# Patient Record
Sex: FEMALE | URBAN - METROPOLITAN AREA
[De-identification: names, ages, dates, MRNs, and addresses within clinical notes are randomized per-mention and may not be internally consistent; named-entity substitution may affect disease eponyms.]

---

## 2024-06-13 ENCOUNTER — TRANSCRIBE ORDERS (OUTPATIENT)
Age: 84
End: 2024-06-13

## 2024-06-13 DIAGNOSIS — I73.9 PERIPHERAL VASCULAR DISEASE, UNSPECIFIED: ICD-10-CM

## 2024-06-13 DIAGNOSIS — L97.512 RIGHT FOOT ULCER, WITH FAT LAYER EXPOSED: Primary | ICD-10-CM

## 2024-06-13 DIAGNOSIS — M20.11 ACQUIRED HALLUX VALGUS OF RIGHT FOOT: ICD-10-CM

## 2024-09-13 ENCOUNTER — PRE-ADMISSION TESTING (OUTPATIENT)
Dept: PREADMISSION TESTING | Facility: HOSPITAL | Age: 84
End: 2024-09-13
Payer: MEDICARE

## 2024-09-13 ENCOUNTER — HOSPITAL ENCOUNTER (OUTPATIENT)
Dept: GENERAL RADIOLOGY | Facility: HOSPITAL | Age: 84
Discharge: HOME | End: 2024-09-13
Payer: MEDICARE

## 2024-09-13 VITALS
OXYGEN SATURATION: 94 % | TEMPERATURE: 97.8 F | DIASTOLIC BLOOD PRESSURE: 68 MMHG | RESPIRATION RATE: 20 BRPM | BODY MASS INDEX: 21.99 KG/M2 | HEIGHT: 65 IN | HEART RATE: 82 BPM | SYSTOLIC BLOOD PRESSURE: 168 MMHG | WEIGHT: 132 LBS

## 2024-09-13 LAB
ALBUMIN SERPL-MCNC: 4.1 G/DL (ref 3.5–5.2)
ALBUMIN/GLOB SERPL: 1.9 G/DL
ALP SERPL-CCNC: 71 U/L (ref 39–117)
ALT SERPL W P-5'-P-CCNC: 35 U/L (ref 1–33)
ANION GAP SERPL CALCULATED.3IONS-SCNC: 10 MMOL/L (ref 5–15)
AST SERPL-CCNC: 27 U/L (ref 1–32)
BILIRUB SERPL-MCNC: 0.3 MG/DL (ref 0–1.2)
BUN SERPL-MCNC: 17 MG/DL (ref 8–23)
BUN/CREAT SERPL: 22.1 (ref 7–25)
CALCIUM SPEC-SCNC: 9.6 MG/DL (ref 8.6–10.5)
CHLORIDE SERPL-SCNC: 103 MMOL/L (ref 98–107)
CO2 SERPL-SCNC: 25 MMOL/L (ref 22–29)
CREAT SERPL-MCNC: 0.77 MG/DL (ref 0.57–1)
DEPRECATED RDW RBC AUTO: 46.9 FL (ref 37–54)
EGFRCR SERPLBLD CKD-EPI 2021: 75.7 ML/MIN/1.73
ERYTHROCYTE [DISTWIDTH] IN BLOOD BY AUTOMATED COUNT: 12.3 % (ref 12.3–15.4)
GLOBULIN UR ELPH-MCNC: 2.2 GM/DL
GLUCOSE SERPL-MCNC: 78 MG/DL (ref 65–99)
HBA1C MFR BLD: 5.6 % (ref 4.8–5.6)
HCT VFR BLD AUTO: 39.3 % (ref 34–46.6)
HGB BLD-MCNC: 12.6 G/DL (ref 12–15.9)
INR PPP: 1.05 (ref 0.9–1.1)
MCH RBC QN AUTO: 33.2 PG (ref 26.6–33)
MCHC RBC AUTO-ENTMCNC: 32.1 G/DL (ref 31.5–35.7)
MCV RBC AUTO: 103.4 FL (ref 79–97)
PLATELET # BLD AUTO: 211 10*3/MM3 (ref 140–450)
PMV BLD AUTO: 9.8 FL (ref 6–12)
POTASSIUM SERPL-SCNC: 4.3 MMOL/L (ref 3.5–5.2)
PROT SERPL-MCNC: 6.3 G/DL (ref 6–8.5)
PROTHROMBIN TIME: 13.9 SECONDS (ref 11.7–14.2)
QT INTERVAL: 401 MS
QTC INTERVAL: 436 MS
RBC # BLD AUTO: 3.8 10*6/MM3 (ref 3.77–5.28)
SODIUM SERPL-SCNC: 138 MMOL/L (ref 136–145)
WBC NRBC COR # BLD AUTO: 9.08 10*3/MM3 (ref 3.4–10.8)

## 2024-09-13 PROCEDURE — 83036 HEMOGLOBIN GLYCOSYLATED A1C: CPT

## 2024-09-13 PROCEDURE — 85027 COMPLETE CBC AUTOMATED: CPT

## 2024-09-13 PROCEDURE — 85610 PROTHROMBIN TIME: CPT

## 2024-09-13 PROCEDURE — 36415 COLL VENOUS BLD VENIPUNCTURE: CPT

## 2024-09-13 PROCEDURE — 93010 ELECTROCARDIOGRAM REPORT: CPT | Performed by: INTERNAL MEDICINE

## 2024-09-13 PROCEDURE — 73502 X-RAY EXAM HIP UNI 2-3 VIEWS: CPT

## 2024-09-13 PROCEDURE — 80053 COMPREHEN METABOLIC PANEL: CPT

## 2024-09-13 PROCEDURE — 93005 ELECTROCARDIOGRAM TRACING: CPT

## 2024-09-13 RX ORDER — LOSARTAN POTASSIUM 25 MG/1
25 TABLET ORAL DAILY
COMMUNITY
Start: 2024-05-02

## 2024-09-13 RX ORDER — ACETAMINOPHEN 500 MG
500 TABLET ORAL EVERY 6 HOURS PRN
COMMUNITY
Start: 2024-07-11

## 2024-09-13 RX ORDER — CLOPIDOGREL BISULFATE 75 MG/1
75 TABLET ORAL DAILY
COMMUNITY
Start: 2024-06-17

## 2024-09-13 RX ORDER — ATORVASTATIN CALCIUM 20 MG/1
20 TABLET, FILM COATED ORAL DAILY
COMMUNITY
Start: 2024-06-17

## 2024-09-13 RX ORDER — ALBUTEROL SULFATE 90 UG/1
2 INHALANT RESPIRATORY (INHALATION) EVERY 4 HOURS PRN
COMMUNITY

## 2024-09-13 NOTE — PAT
Patient scheduled for a right total hip arthroplasty with Dr. Rocha.  She has never had a joint replacement. She has watched the joint replacement video.  She is here with her daughter today.  She is relatively healthy for her again.  No hx CAD.  She denies CP.  In the past she may get SOA walking up stairs but she has not been able to walk up stairs due to her hip.  She says she does not get SOA walking using her walker.  She says her daughter lives with her and will be able to help her after discharge.  Dr. Rocha is aware of the healing ulcer on her right toe.  Cardiac RRR.  No carotid bruit detectable.  Pulm:  Clear to ausc. Resp unlabored.  Labs and EKG acceptable.  X-ray pending.

## 2024-09-13 NOTE — DISCHARGE INSTRUCTIONS
CHLORHEXIDINE CLOTH INSTRUCTIONS  The morning of surgery follow these instructions using the Chlorhexidine cloths you've been given.  These steps reduce bacteria on the body.  Do not use the cloths near your eyes, ears mouth, genitalia or on open wounds.  Throw the cloths away after use but do not try to flush them down a toilet.      Open and remove one cloth at a time from the package.    Leave the cloth unfolded and begin the bathing.  Massage the skin with the cloths using gentle pressure to remove bacteria.  Do not scrub harshly.   Follow the steps below with one 2% CHG cloth per area (6 total cloths).  One cloth for neck, shoulders and chest.  One cloth for both arms, hands, fingers and underarms (do underarms last).  One cloth for the abdomen followed by groin.  One cloth for right leg and foot including between the toes.  One cloth for left leg and foot including between the toes.  The last cloth is to be used for the back of the neck, back and buttocks.    Allow the CHG to air dry 3 minutes on the skin which will give it time to work and decrease the chance of irritation.  The skin may feel sticky until it is dry.  Do not rinse with water or any other liquid or you will lose the beneficial effects of the CHG.  If mild skin irritation occurs, do rinse the skin to remove the CHG.  Report this to the nurse at time of admission.  Do not apply lotions, creams, ointments, deodorants or perfumes after using the clothes. Dress in clean clothes before coming to the hospital.    Take the following medications the morning of surgery:  INHALER   FOLLOW DR MCKENZIE INSTRUCTIONS REGARDING CLOPIDOGREL       If you are on prescription narcotic pain medication to control your pain you may also take that medication the morning of surgery.      General Instructions:     Do not eat solid food after midnight the night before surgery.  Clear liquids day of surgery are allowed but must be stopped at least two hours before your  hospital arrival time.       Allowed clear liquids      Water, sodas, and tea or coffee with no cream or milk added.       12 to 20 ounces of a clear liquid that contains carbohydrates is recommended.  If non-diabetic, have Gatorade or Powerade.  If diabetic, have G2 or Powerade Zero.     Do not have liquids red in color.  Do not consume chicken, beef, pork or vegetable broth or bouillon cubes of any variety as they are not considered clear liquids and are not allowed.      Infants may have breast milk up to four hours before surgery.  Infants drinking formula may drink formula up to six hours before surgery.   Patients who avoid smoking, chewing tobacco and alcohol for 4 weeks prior to surgery have a reduced risk of post-operative complications.  Quit smoking as many days before surgery as you can.  Do not smoke, use chewing tobacco or drink alcohol the day of surgery.   If applicable bring your C-PAP/ BI-PAP machine in with you to preop day of surgery.  Bring any papers given to you in the doctor’s office.  Wear clean comfortable clothes.  Do not wear contact lenses, false eyelashes or make-up.  Bring a case for your glasses.   Bring crutches or walker if applicable.  Remove all piercings.  Leave jewelry and any other valuables at home.  Hair extensions with metal clips must be removed prior to surgery.  The Pre-Admission Testing nurse will instruct you to bring medications if unable to obtain an accurate list in Pre-Admission Testing.          Preventing a Surgical Site Infection:  For 2 to 3 days before surgery, avoid shaving with a razor because the razor can irritate skin and make it easier to develop an infection.    Any areas of open skin can increase the risk of a post-operative wound infection by allowing bacteria to enter and travel throughout the body.  Notify your surgeon if you have any skin wounds / rashes even if it is not near the expected surgical site.  The area will need assessed to determine if  surgery should be delayed until it is healed.  The night prior to surgery shower using a fresh bar of anti-bacterial soap (such as Dial) and clean washcloth.  Sleep in a clean bed with clean clothing.  Do not allow pets to sleep with you.  Shower on the morning of surgery using a fresh bar of anti-bacterial soap (such as Dial) and clean washcloth.  Dry with a clean towel and dress in clean clothing.  Ask your surgeon if you will be receiving antibiotics prior to surgery.  Make sure you, your family, and all healthcare providers clean their hands with soap and water or an alcohol based hand  before caring for you or your wound.    Day of surgery:  Your arrival time is approximately two hours before your scheduled surgery time.  Please note if you have an early arrival time the surgery doors do not open before 5:00 AM.  Upon arrival, a Pre-op nurse and Anesthesiologist will review your health history, obtain vital signs, and answer questions you may have.  The only belongings needed at this time will be a list of your home medications and if applicable your C-PAP/BI-PAP machine.  A Pre-op nurse will start an IV and you may receive medication in preparation for surgery, including something to help you relax.     Please be aware that surgery does come with discomfort.  We want to make every effort to control your discomfort so please discuss any uncontrolled symptoms with your nurse.   Your doctor will most likely have prescribed pain medications.      If you are going home after surgery you will receive individualized written care instructions before being discharged.  A responsible adult must drive you to and from the hospital on the day of your surgery and ideally stay with you through the night.   .  Discharge prescriptions can be filled by the hospital pharmacy during regular pharmacy hours.  If you are having surgery late in the day/evening your prescription may be e-prescribed to your pharmacy.  Please  verify your pharmacy hours or chose a 24 hour pharmacy to avoid not having access to your prescription because your pharmacy has closed for the day.    If you are staying overnight following surgery, you will be transported to your hospital room following the recovery period.  [facility] has all private rooms.    If you have any questions please call Pre-Admission Testing at [phone].  Deductibles and co-payments are collected on the day of service. Please be prepared to pay the required co-pay, deductible or deposit on the day of service as defined by your plan.    Call your surgeon immediately if you experience any of the following symptoms:  Sore Throat  Shortness of Breath or difficulty breathing  Cough  Chills  Body soreness or muscle pain  Headache  Fever  New loss of taste or smell  Do not arrive for your surgery ill.  Your procedure will need to be rescheduled to another time.  You will need to call your physician before the day of surgery to avoid any unnecessary exposure to hospital staff as well as other patients.

## 2024-09-25 ENCOUNTER — ANESTHESIA EVENT (OUTPATIENT)
Dept: PERIOP | Facility: HOSPITAL | Age: 84
End: 2024-09-25
Payer: MEDICARE

## 2024-09-25 ENCOUNTER — HOSPITAL ENCOUNTER (OUTPATIENT)
Facility: HOSPITAL | Age: 84
Setting detail: OBSERVATION
Discharge: HOME HEALTH | End: 2024-09-26
Attending: ORTHOPAEDIC SURGERY | Admitting: ORTHOPAEDIC SURGERY
Payer: MEDICARE

## 2024-09-25 ENCOUNTER — APPOINTMENT (OUTPATIENT)
Dept: GENERAL RADIOLOGY | Facility: HOSPITAL | Age: 84
End: 2024-09-25
Payer: MEDICARE

## 2024-09-25 ENCOUNTER — ANESTHESIA (OUTPATIENT)
Dept: PERIOP | Facility: HOSPITAL | Age: 84
End: 2024-09-25
Payer: MEDICARE

## 2024-09-25 DIAGNOSIS — Z96.641 H/O TOTAL HIP ARTHROPLASTY, RIGHT: Primary | ICD-10-CM

## 2024-09-25 PROCEDURE — 25010000002 PROPOFOL 10 MG/ML EMULSION: Performed by: NURSE ANESTHETIST, CERTIFIED REGISTERED

## 2024-09-25 PROCEDURE — 25010000002 ROPIVACAINE PER 1 MG: Performed by: ORTHOPAEDIC SURGERY

## 2024-09-25 PROCEDURE — 25010000002 KETOROLAC TROMETHAMINE PER 15 MG: Performed by: ORTHOPAEDIC SURGERY

## 2024-09-25 PROCEDURE — 73501 X-RAY EXAM HIP UNI 1 VIEW: CPT

## 2024-09-25 PROCEDURE — G0378 HOSPITAL OBSERVATION PER HR: HCPCS

## 2024-09-25 PROCEDURE — 25810000003 LACTATED RINGERS PER 1000 ML: Performed by: STUDENT IN AN ORGANIZED HEALTH CARE EDUCATION/TRAINING PROGRAM

## 2024-09-25 PROCEDURE — C1776 JOINT DEVICE (IMPLANTABLE): HCPCS | Performed by: ORTHOPAEDIC SURGERY

## 2024-09-25 PROCEDURE — 63710000001 POLYETHYLENE GLYCOL 17 G PACK: Performed by: ORTHOPAEDIC SURGERY

## 2024-09-25 PROCEDURE — A9270 NON-COVERED ITEM OR SERVICE: HCPCS | Performed by: ORTHOPAEDIC SURGERY

## 2024-09-25 PROCEDURE — 25010000002 EPINEPHRINE 1 MG/ML SOLUTION 30 ML VIAL: Performed by: ORTHOPAEDIC SURGERY

## 2024-09-25 PROCEDURE — 63710000001 ASPIRIN 81 MG TABLET DELAYED-RELEASE: Performed by: ORTHOPAEDIC SURGERY

## 2024-09-25 PROCEDURE — 25010000002 CLONIDINE PER 1 MG: Performed by: ORTHOPAEDIC SURGERY

## 2024-09-25 PROCEDURE — 76000 FLUOROSCOPY <1 HR PHYS/QHP: CPT

## 2024-09-25 PROCEDURE — 25010000002 BUPIVACAINE PF 0.75 % SOLUTION: Performed by: STUDENT IN AN ORGANIZED HEALTH CARE EDUCATION/TRAINING PROGRAM

## 2024-09-25 PROCEDURE — 25010000002 CEFAZOLIN PER 500 MG: Performed by: ORTHOPAEDIC SURGERY

## 2024-09-25 PROCEDURE — C1713 ANCHOR/SCREW BN/BN,TIS/BN: HCPCS | Performed by: ORTHOPAEDIC SURGERY

## 2024-09-25 PROCEDURE — 25010000002 ONDANSETRON PER 1 MG: Performed by: NURSE ANESTHETIST, CERTIFIED REGISTERED

## 2024-09-25 PROCEDURE — 63710000001 ATORVASTATIN 20 MG TABLET: Performed by: ORTHOPAEDIC SURGERY

## 2024-09-25 DEVICE — CANCELLOUS BONE SCREW Ø 6.5 L 40
Type: IMPLANTABLE DEVICE | Site: HIP | Status: FUNCTIONAL
Brand: MPACT EXTENSION

## 2024-09-25 DEVICE — PALACOS® R IS A FAST-CURING, RADIOPAQUE, POLY(METHYL METHACRYLATE)-BASED BONE CEMENT.PALACOS ® R CONTAINS THE X-RAY CONTRAST MEDIUM ZIRCONIUM DIOXIDE. TO IMPROVE VISIBILITY IN THE SURGICAL FIELD PALACOS ® R HAS BEEN COLOURED WITH CHLOROPHYLL (E141). THE BONE CEMENT IS PREPARED DIRECTLY BEFORE USE BY MIXING A POLYMER POWDER COMPONENT WITH A LIQUID MONOMER COMPONENT. A DUCTILE DOUGH FORMS WHICH CURES WITHIN A FEW MINUTES.
Type: IMPLANTABLE DEVICE | Site: HIP | Status: FUNCTIONAL
Brand: PALACOS®

## 2024-09-25 DEVICE — FLAT PE  HC LINER Ø 36 / E
Type: IMPLANTABLE DEVICE | Site: HIP | Status: FUNCTIONAL
Brand: MPACT ACETABULAR SYSTEM

## 2024-09-25 DEVICE — CANCELLOUS BONE SCREW Ø 6.5 L 35
Type: IMPLANTABLE DEVICE | Site: HIP | Status: FUNCTIONAL
Brand: MPACT EXTENSION

## 2024-09-25 DEVICE — KNOTLESS TISSUE CONTROL DEVICE, UNDYED UNIDIRECTIONAL (ANTIBACTERIAL) SYNTHETIC ABSORBABLE DEVICE
Type: IMPLANTABLE DEVICE | Site: HIP | Status: FUNCTIONAL
Brand: STRATAFIX

## 2024-09-25 DEVICE — AMISTEM C CEMENTED STEM STANDARD SIZE 2
Type: IMPLANTABLE DEVICE | Site: HIP | Status: FUNCTIONAL
Brand: AMISTEM C FEMORAL STEMS

## 2024-09-25 DEVICE — ACETABULAR SHELL Ø54 TWO-HOLE
Type: IMPLANTABLE DEVICE | Site: HIP | Status: FUNCTIONAL
Brand: MPACT 3D METAL

## 2024-09-25 DEVICE — IMPLANTABLE DEVICE: Type: IMPLANTABLE DEVICE | Status: FUNCTIONAL

## 2024-09-25 DEVICE — DEV CONTRL TISS STRATAFIX SYMM PDS PLUS VIL CT-1 45CM: Type: IMPLANTABLE DEVICE | Site: HIP | Status: FUNCTIONAL

## 2024-09-25 DEVICE — FEMORAL HEAD Ø 36 SIZE L
Type: IMPLANTABLE DEVICE | Site: HIP | Status: FUNCTIONAL
Brand: MECTACER BIOLOX DELTA FEMORAL BALL HEAD

## 2024-09-25 RX ORDER — PROMETHAZINE HYDROCHLORIDE 25 MG/1
25 SUPPOSITORY RECTAL ONCE AS NEEDED
Status: DISCONTINUED | OUTPATIENT
Start: 2024-09-25 | End: 2024-09-25 | Stop reason: HOSPADM

## 2024-09-25 RX ORDER — FENTANYL CITRATE 50 UG/ML
25 INJECTION, SOLUTION INTRAMUSCULAR; INTRAVENOUS
Status: DISCONTINUED | OUTPATIENT
Start: 2024-09-25 | End: 2024-09-25 | Stop reason: HOSPADM

## 2024-09-25 RX ORDER — SODIUM CHLORIDE 9 MG/ML
100 INJECTION, SOLUTION INTRAVENOUS CONTINUOUS
Status: DISCONTINUED | OUTPATIENT
Start: 2024-09-25 | End: 2024-09-26 | Stop reason: HOSPADM

## 2024-09-25 RX ORDER — DOCUSATE SODIUM 100 MG/1
100 CAPSULE, LIQUID FILLED ORAL 2 TIMES DAILY PRN
Status: DISCONTINUED | OUTPATIENT
Start: 2024-09-25 | End: 2024-09-26 | Stop reason: HOSPADM

## 2024-09-25 RX ORDER — HYDROCODONE BITARTRATE AND ACETAMINOPHEN 7.5; 325 MG/1; MG/1
1 TABLET ORAL EVERY 4 HOURS PRN
Status: DISCONTINUED | OUTPATIENT
Start: 2024-09-25 | End: 2024-09-25 | Stop reason: HOSPADM

## 2024-09-25 RX ORDER — DIPHENHYDRAMINE HYDROCHLORIDE 50 MG/ML
12.5 INJECTION INTRAMUSCULAR; INTRAVENOUS
Status: DISCONTINUED | OUTPATIENT
Start: 2024-09-25 | End: 2024-09-25 | Stop reason: HOSPADM

## 2024-09-25 RX ORDER — ALBUTEROL SULFATE 0.83 MG/ML
2.5 SOLUTION RESPIRATORY (INHALATION) EVERY 4 HOURS PRN
Status: DISCONTINUED | OUTPATIENT
Start: 2024-09-25 | End: 2024-09-26 | Stop reason: HOSPADM

## 2024-09-25 RX ORDER — SODIUM CHLORIDE 0.9 % (FLUSH) 0.9 %
3-10 SYRINGE (ML) INJECTION AS NEEDED
Status: DISCONTINUED | OUTPATIENT
Start: 2024-09-25 | End: 2024-09-25 | Stop reason: HOSPADM

## 2024-09-25 RX ORDER — MELOXICAM 7.5 MG/1
7.5 TABLET ORAL DAILY
Status: DISCONTINUED | OUTPATIENT
Start: 2024-09-26 | End: 2024-09-26 | Stop reason: HOSPADM

## 2024-09-25 RX ORDER — HYDROCODONE BITARTRATE AND ACETAMINOPHEN 7.5; 325 MG/1; MG/1
2 TABLET ORAL EVERY 4 HOURS PRN
Status: DISCONTINUED | OUTPATIENT
Start: 2024-09-25 | End: 2024-09-26 | Stop reason: HOSPADM

## 2024-09-25 RX ORDER — LIDOCAINE HYDROCHLORIDE 10 MG/ML
0.5 INJECTION, SOLUTION INFILTRATION; PERINEURAL ONCE AS NEEDED
Status: DISCONTINUED | OUTPATIENT
Start: 2024-09-25 | End: 2024-09-25 | Stop reason: HOSPADM

## 2024-09-25 RX ORDER — PROMETHAZINE HYDROCHLORIDE 25 MG/1
25 TABLET ORAL ONCE AS NEEDED
Status: DISCONTINUED | OUTPATIENT
Start: 2024-09-25 | End: 2024-09-25 | Stop reason: HOSPADM

## 2024-09-25 RX ORDER — HYDRALAZINE HYDROCHLORIDE 20 MG/ML
5 INJECTION INTRAMUSCULAR; INTRAVENOUS
Status: DISCONTINUED | OUTPATIENT
Start: 2024-09-25 | End: 2024-09-25 | Stop reason: HOSPADM

## 2024-09-25 RX ORDER — FLUMAZENIL 0.1 MG/ML
0.2 INJECTION INTRAVENOUS AS NEEDED
Status: DISCONTINUED | OUTPATIENT
Start: 2024-09-25 | End: 2024-09-25 | Stop reason: HOSPADM

## 2024-09-25 RX ORDER — ACETAMINOPHEN 500 MG
1000 TABLET ORAL ONCE
Status: COMPLETED | OUTPATIENT
Start: 2024-09-25 | End: 2024-09-25

## 2024-09-25 RX ORDER — PHENYLEPHRINE HCL IN 0.9% NACL 1 MG/10 ML
SYRINGE (ML) INTRAVENOUS AS NEEDED
Status: DISCONTINUED | OUTPATIENT
Start: 2024-09-25 | End: 2024-09-25 | Stop reason: SURG

## 2024-09-25 RX ORDER — NALOXONE HCL 0.4 MG/ML
0.1 VIAL (ML) INJECTION
Status: DISCONTINUED | OUTPATIENT
Start: 2024-09-25 | End: 2024-09-26 | Stop reason: HOSPADM

## 2024-09-25 RX ORDER — HYDROCODONE BITARTRATE AND ACETAMINOPHEN 5; 325 MG/1; MG/1
1 TABLET ORAL ONCE AS NEEDED
Status: DISCONTINUED | OUTPATIENT
Start: 2024-09-25 | End: 2024-09-25 | Stop reason: HOSPADM

## 2024-09-25 RX ORDER — ONDANSETRON 2 MG/ML
4 INJECTION INTRAMUSCULAR; INTRAVENOUS ONCE AS NEEDED
Status: DISCONTINUED | OUTPATIENT
Start: 2024-09-25 | End: 2024-09-25 | Stop reason: HOSPADM

## 2024-09-25 RX ORDER — BUPIVACAINE HYDROCHLORIDE 7.5 MG/ML
INJECTION, SOLUTION EPIDURAL; RETROBULBAR
Status: COMPLETED | OUTPATIENT
Start: 2024-09-25 | End: 2024-09-25

## 2024-09-25 RX ORDER — PREGABALIN 75 MG/1
75 CAPSULE ORAL ONCE
Status: COMPLETED | OUTPATIENT
Start: 2024-09-25 | End: 2024-09-25

## 2024-09-25 RX ORDER — HYDROMORPHONE HYDROCHLORIDE 1 MG/ML
0.5 INJECTION, SOLUTION INTRAMUSCULAR; INTRAVENOUS; SUBCUTANEOUS
Status: DISCONTINUED | OUTPATIENT
Start: 2024-09-25 | End: 2024-09-26 | Stop reason: HOSPADM

## 2024-09-25 RX ORDER — MAGNESIUM HYDROXIDE 1200 MG/15ML
LIQUID ORAL AS NEEDED
Status: DISCONTINUED | OUTPATIENT
Start: 2024-09-25 | End: 2024-09-25 | Stop reason: HOSPADM

## 2024-09-25 RX ORDER — TRANEXAMIC ACID 100 MG/ML
INJECTION, SOLUTION INTRAVENOUS AS NEEDED
Status: DISCONTINUED | OUTPATIENT
Start: 2024-09-25 | End: 2024-09-25 | Stop reason: SURG

## 2024-09-25 RX ORDER — SODIUM CHLORIDE 0.9 % (FLUSH) 0.9 %
3 SYRINGE (ML) INJECTION EVERY 12 HOURS SCHEDULED
Status: DISCONTINUED | OUTPATIENT
Start: 2024-09-25 | End: 2024-09-25 | Stop reason: HOSPADM

## 2024-09-25 RX ORDER — MIDAZOLAM HYDROCHLORIDE 1 MG/ML
0.5 INJECTION INTRAMUSCULAR; INTRAVENOUS
Status: DISCONTINUED | OUTPATIENT
Start: 2024-09-25 | End: 2024-09-25

## 2024-09-25 RX ORDER — ONDANSETRON 2 MG/ML
INJECTION INTRAMUSCULAR; INTRAVENOUS AS NEEDED
Status: DISCONTINUED | OUTPATIENT
Start: 2024-09-25 | End: 2024-09-25 | Stop reason: SURG

## 2024-09-25 RX ORDER — DROPERIDOL 2.5 MG/ML
0.62 INJECTION, SOLUTION INTRAMUSCULAR; INTRAVENOUS
Status: DISCONTINUED | OUTPATIENT
Start: 2024-09-25 | End: 2024-09-25 | Stop reason: HOSPADM

## 2024-09-25 RX ORDER — ATORVASTATIN CALCIUM 20 MG/1
20 TABLET, FILM COATED ORAL DAILY
Status: DISCONTINUED | OUTPATIENT
Start: 2024-09-25 | End: 2024-09-26 | Stop reason: HOSPADM

## 2024-09-25 RX ORDER — ONDANSETRON 4 MG/1
4 TABLET, ORALLY DISINTEGRATING ORAL EVERY 6 HOURS PRN
Status: DISCONTINUED | OUTPATIENT
Start: 2024-09-25 | End: 2024-09-26 | Stop reason: HOSPADM

## 2024-09-25 RX ORDER — FENTANYL CITRATE 50 UG/ML
50 INJECTION, SOLUTION INTRAMUSCULAR; INTRAVENOUS ONCE AS NEEDED
Status: DISCONTINUED | OUTPATIENT
Start: 2024-09-25 | End: 2024-09-25

## 2024-09-25 RX ORDER — ASPIRIN 81 MG/1
81 TABLET ORAL 2 TIMES DAILY
Status: DISCONTINUED | OUTPATIENT
Start: 2024-09-25 | End: 2024-09-26 | Stop reason: HOSPADM

## 2024-09-25 RX ORDER — NALOXONE HCL 0.4 MG/ML
0.2 VIAL (ML) INJECTION AS NEEDED
Status: DISCONTINUED | OUTPATIENT
Start: 2024-09-25 | End: 2024-09-25 | Stop reason: HOSPADM

## 2024-09-25 RX ORDER — ONDANSETRON 2 MG/ML
4 INJECTION INTRAMUSCULAR; INTRAVENOUS EVERY 6 HOURS PRN
Status: DISCONTINUED | OUTPATIENT
Start: 2024-09-25 | End: 2024-09-26 | Stop reason: HOSPADM

## 2024-09-25 RX ORDER — LIDOCAINE HYDROCHLORIDE 20 MG/ML
INJECTION, SOLUTION EPIDURAL; INFILTRATION; INTRACAUDAL; PERINEURAL AS NEEDED
Status: DISCONTINUED | OUTPATIENT
Start: 2024-09-25 | End: 2024-09-25 | Stop reason: SURG

## 2024-09-25 RX ORDER — IPRATROPIUM BROMIDE AND ALBUTEROL SULFATE 2.5; .5 MG/3ML; MG/3ML
3 SOLUTION RESPIRATORY (INHALATION) ONCE AS NEEDED
Status: DISCONTINUED | OUTPATIENT
Start: 2024-09-25 | End: 2024-09-25 | Stop reason: HOSPADM

## 2024-09-25 RX ORDER — FAMOTIDINE 10 MG/ML
20 INJECTION, SOLUTION INTRAVENOUS ONCE
Status: DISCONTINUED | OUTPATIENT
Start: 2024-09-25 | End: 2024-09-25 | Stop reason: HOSPADM

## 2024-09-25 RX ORDER — HYDROMORPHONE HYDROCHLORIDE 1 MG/ML
0.25 INJECTION, SOLUTION INTRAMUSCULAR; INTRAVENOUS; SUBCUTANEOUS
Status: DISCONTINUED | OUTPATIENT
Start: 2024-09-25 | End: 2024-09-25 | Stop reason: HOSPADM

## 2024-09-25 RX ORDER — TRAMADOL HYDROCHLORIDE 50 MG/1
50 TABLET ORAL EVERY 8 HOURS PRN
Status: DISCONTINUED | OUTPATIENT
Start: 2024-09-25 | End: 2024-09-26 | Stop reason: HOSPADM

## 2024-09-25 RX ORDER — SODIUM CHLORIDE, SODIUM LACTATE, POTASSIUM CHLORIDE, CALCIUM CHLORIDE 600; 310; 30; 20 MG/100ML; MG/100ML; MG/100ML; MG/100ML
9 INJECTION, SOLUTION INTRAVENOUS CONTINUOUS
Status: DISCONTINUED | OUTPATIENT
Start: 2024-09-25 | End: 2024-09-25

## 2024-09-25 RX ORDER — POLYETHYLENE GLYCOL 3350 17 G/17G
17 POWDER, FOR SOLUTION ORAL 2 TIMES DAILY
Status: DISCONTINUED | OUTPATIENT
Start: 2024-09-25 | End: 2024-09-26 | Stop reason: HOSPADM

## 2024-09-25 RX ORDER — ACETAMINOPHEN 325 MG/1
325 TABLET ORAL EVERY 4 HOURS PRN
Status: DISCONTINUED | OUTPATIENT
Start: 2024-09-25 | End: 2024-09-26 | Stop reason: HOSPADM

## 2024-09-25 RX ORDER — HYDROCODONE BITARTRATE AND ACETAMINOPHEN 7.5; 325 MG/1; MG/1
1 TABLET ORAL EVERY 4 HOURS PRN
Status: DISCONTINUED | OUTPATIENT
Start: 2024-09-25 | End: 2024-09-26 | Stop reason: HOSPADM

## 2024-09-25 RX ORDER — KETOROLAC TROMETHAMINE 15 MG/ML
15 INJECTION, SOLUTION INTRAMUSCULAR; INTRAVENOUS EVERY 6 HOURS
Status: DISCONTINUED | OUTPATIENT
Start: 2024-09-25 | End: 2024-09-26 | Stop reason: HOSPADM

## 2024-09-25 RX ORDER — LABETALOL HYDROCHLORIDE 5 MG/ML
5 INJECTION, SOLUTION INTRAVENOUS
Status: DISCONTINUED | OUTPATIENT
Start: 2024-09-25 | End: 2024-09-25 | Stop reason: HOSPADM

## 2024-09-25 RX ORDER — EPHEDRINE SULFATE 50 MG/ML
5 INJECTION, SOLUTION INTRAVENOUS ONCE AS NEEDED
Status: DISCONTINUED | OUTPATIENT
Start: 2024-09-25 | End: 2024-09-25 | Stop reason: HOSPADM

## 2024-09-25 RX ORDER — CELECOXIB 200 MG/1
400 CAPSULE ORAL ONCE
Status: COMPLETED | OUTPATIENT
Start: 2024-09-25 | End: 2024-09-25

## 2024-09-25 RX ADMIN — BUPIVACAINE HYDROCHLORIDE 1.4 ML: 7.5 INJECTION, SOLUTION EPIDURAL; RETROBULBAR at 12:07

## 2024-09-25 RX ADMIN — TRANEXAMIC ACID 1000 MG: 100 INJECTION, SOLUTION INTRAVENOUS at 13:52

## 2024-09-25 RX ADMIN — ASPIRIN 81 MG: 81 TABLET, COATED ORAL at 18:11

## 2024-09-25 RX ADMIN — SODIUM CHLORIDE 2 G: 900 INJECTION INTRAVENOUS at 19:50

## 2024-09-25 RX ADMIN — Medication 150 MCG: at 13:22

## 2024-09-25 RX ADMIN — SODIUM CHLORIDE, POTASSIUM CHLORIDE, SODIUM LACTATE AND CALCIUM CHLORIDE: 600; 310; 30; 20 INJECTION, SOLUTION INTRAVENOUS at 13:19

## 2024-09-25 RX ADMIN — Medication 100 MCG: at 12:33

## 2024-09-25 RX ADMIN — Medication 100 MCG: at 13:03

## 2024-09-25 RX ADMIN — Medication 100 MCG: at 12:49

## 2024-09-25 RX ADMIN — SODIUM CHLORIDE 2000 MG: 900 INJECTION INTRAVENOUS at 11:47

## 2024-09-25 RX ADMIN — PREGABALIN 75 MG: 75 CAPSULE ORAL at 08:57

## 2024-09-25 RX ADMIN — ONDANSETRON 4 MG: 2 INJECTION INTRAMUSCULAR; INTRAVENOUS at 14:08

## 2024-09-25 RX ADMIN — Medication 150 MCG: at 13:18

## 2024-09-25 RX ADMIN — Medication 150 MCG: at 13:28

## 2024-09-25 RX ADMIN — Medication 150 MCG: at 13:58

## 2024-09-25 RX ADMIN — PROPOFOL 100 MCG/KG/MIN: 10 INJECTION, EMULSION INTRAVENOUS at 12:14

## 2024-09-25 RX ADMIN — Medication 150 MCG: at 13:53

## 2024-09-25 RX ADMIN — ATORVASTATIN CALCIUM 20 MG: 20 TABLET, FILM COATED ORAL at 18:11

## 2024-09-25 RX ADMIN — LIDOCAINE HYDROCHLORIDE 100 MG: 20 INJECTION, SOLUTION EPIDURAL; INFILTRATION; INTRACAUDAL; PERINEURAL at 12:14

## 2024-09-25 RX ADMIN — TRANEXAMIC ACID 1000 MG: 100 INJECTION, SOLUTION INTRAVENOUS at 12:20

## 2024-09-25 RX ADMIN — SODIUM CHLORIDE, POTASSIUM CHLORIDE, SODIUM LACTATE AND CALCIUM CHLORIDE: 600; 310; 30; 20 INJECTION, SOLUTION INTRAVENOUS at 11:52

## 2024-09-25 RX ADMIN — ACETAMINOPHEN 1000 MG: 500 TABLET ORAL at 08:57

## 2024-09-25 RX ADMIN — KETOROLAC TROMETHAMINE 15 MG: 15 INJECTION, SOLUTION INTRAMUSCULAR; INTRAVENOUS at 23:59

## 2024-09-25 RX ADMIN — CELECOXIB 400 MG: 200 CAPSULE ORAL at 08:57

## 2024-09-25 RX ADMIN — Medication 150 MCG: at 13:38

## 2024-09-25 RX ADMIN — KETOROLAC TROMETHAMINE 15 MG: 15 INJECTION, SOLUTION INTRAMUSCULAR; INTRAVENOUS at 18:11

## 2024-09-25 RX ADMIN — POLYETHYLENE GLYCOL 3350 17 G: 17 POWDER, FOR SOLUTION ORAL at 19:51

## 2024-09-25 RX ADMIN — Medication 150 MCG: at 14:08

## 2024-09-25 NOTE — SIGNIFICANT NOTE
"   09/25/24 0927   Peripheral IV 09/25/24 0927 Anterior;Left Forearm   Placement date: If unknown, DO NOT use \"Add Comment\" note/Placement time: If unknown, DO NOT use \"Add Comment\" note: 09/25/24 0927   Hand Hygiene Completed: Yes  Size (Gauge): (c) 20 G  Orientation: Anterior;Left  Location: Forearm  Site Prep: Chlorhe...   Site Assessment Clean;Dry;Intact   Dressing Type Transparent   Line Status Blood return noted;Infusing;Flushed   Dressing Status Clean;Dry;Intact   Dressing Intervention New dressing     Ultrasound Inserted IV Site:lfa    Catheter Length:1.5in    Diameter:0.22cm    Depth:0.25cm      Vascular Access Score=4  1) Palpable / Visible / Dis  2) Palpable / Viasible / Not Distended  3) Easily Palpable / Not Visibile  4) Poorly Palpable / Visible  5) Poorly / Nonpalpable / NV   "

## 2024-09-25 NOTE — OP NOTE
RightTOTAL HIP ARTHROPLASTY ANTERIOR WITH HANA TABLE  Procedure Note    Magali Pulliam  9/25/2024    Pre-op Diagnosis: Right hip osteoarthritis.   Post-op Diagnosis: Same  Procedure:    Right total hip arthroplasty, anterior approach CPT Code 54823    Intraoperative interpretation of fluoroscopic imaging of the hip, 2-3 views CPT code 07247  Approach: Surgical Approach: Hip Direct Anterior (Smith-Bartlett)      Surgeon:  Erik Rocha MD  Assistant: CHARLES Harvey  The services of a first assist were necessary for performing the procedure safely and expeditiously.  The first assist was present for the entire duration of the case and helped with positioning, retraction and closure of the incision.    Anesthesia: Spinal, Anesthesiologist: Thony Hernández MD; Shruthi Calabrese MD  CRNA: Imelda Hamilton CRNA  Staff: Circulator: Olivia Moseley RN  Radiology Technologist: Brittney Vallejo  Scrub Person: Melvina Martínez  Orderly: Franky Nogueira  Assistant: Abdullahi Velez PA-C CFA  Estimated Blood Loss: 100ml  Specimens: * No orders in the log *  Drains: none  Complications: None    Components Utilized:    Implant Name Type Inv. Item Serial No.  Lot No. LRB No. Used Action   DEV CONTRL TISS STRATAFIX SPIRAL MNCRYL UD 3/0 PLS 30CM - [id number] Implant DEV CONTRL TISS STRATAFIX SPIRAL MNCRYL UD 3/0 PLS 30CM  [employer] [id number] Right 1 Implanted   DEV CONTRL TISS STRATAFIX SYMM PDS PLUS GRACE CT-1 45CM - [id number] Implant DEV CONTRL TISS STRATAFIX SYMM PDS PLUS GRACE CT-1 45CM  [employer]  [employer] [id number] Right 1 Implanted   CMT BONE PALACOS R HI/VISC 1X40 - [id number] Implant CMT BONE PALACOS R HI/VISC 1X40  [facility] [id number] Right 1 Implanted   CMT BONE PALACOS R HI/VISC 1X40 - [id number] Implant CMT BONE PALACOS R HI/VISC 1X40  [employer] [id number] Right 1 Implanted   SHLL ACET MPACT 3D 2HL 54MM - [id number] Implant SHLL ACET MPACT 3D 2HL 54MM  [employer]  [place] [id number] Right 1 Implanted   LINER ACET MPACT HOODED HX/PE 0DEG MARLYN 36MM - [id number] Implant LINER ACET MPACT HOODED HX/PE 0DEG MARLYN 36MM  [employer] [place] [id number] Right 1 Implanted   SCRW CANC BONE 6.5X35MM - [id number] Implant SCRW CANC BONE 6.5X35MM  [employer] [place] [id number] Right 1 Implanted   SCRW CANC BONE 6.5X40MM - [id number] Implant SCRW CANC BONE 6.5X40MM  [employer] [place] [id number] Right 1 Implanted   STEM FEM/HIP AMISTEMC CMT SS SZ2 - [id number] Implant STEM FEM/HIP AMISTEMC CMT SS SZ2  [employer] [place] [id number] Right 1 Implanted   HD FEM/HIP MECTACER BIOLOX/DELTA 36MM LG - [id number] Implant HD FEM/HIP MECTACER BIOLOX/DELTA 36MM LG  [employer] [place] [id number] Right 1 Implanted       Indication for Procedure:  This patient is a 84 y.o. female who has failed conservative treatment for management of arthritis of the operative hip.  Surgical options and non-surgical options were discussed in detail and to the patient's satisfaction.  Surgical intervention was recommended based on the patient's injury and functional status.      The risks and benefits of surgery were discussed with patient and informed consent was obtained.  Risks include but are not limited to, infection, bleeding, nerve injury, blood clots, risks associated with anesthesia, need for further surgery, persistent pain, and possibly death. The risk of leg length discrepancy was also discussed with the patient.    Protocols for intravenous antibiotics and venous thrombosis were followed for this patient.  IV antibiotics were infused prior to surgery and will be discontinued within 24 hours of completion of the surgical procedure.         DESCRIPTION OF PROCEDURE:     The patient was seen in preoperative area where their surgical site was marked. Preoperative antibiotics were received. H&P and consent updated. They were taken to the Operating Room and provided the aforementioned anesthetic on the hospital bed.  The patient was then transferred to the [facility] operating table  in the supine position.  Traction boots were applied.  The perineal post was placed.  All bony prominences were well padded.  At this point the extremity is prepped and draped in usual sterile fashion using alcohol followed by ChloraPrep.  Next a formal surgical timeout was carried out.  All members of the team were in agreement.  At this point the anterior superior iliac spine was marked out and an incision extending from about to 2 cm lateral and 1 cm distal to the anterior superior iliac spine was extended in the longitudinal fashion through the skin and subcutaneous tissue with a 10 blade scalpel.  The fascia overlying the tensor fascia grzegorz muscle belly was identified and sharply incised.  The tensor fascia was reflected laterally and an Adson-Neel retractor was placed.  It was noted that she had significant fatty atrophy of her tensor rectus as well as her gluteus medius.  Next the myofascia of the rectus femoris was incised using the Bovie and the rectus was reflected medially.  The reflected head of the rectus was partially released.  Next the floor of the rectus femoris fascia was incised the ascending branch lateral femoral circumflex vessels were identified and cauterized using the bipolar cautery device.  Next the pericapsular fat pad was incised and the iliocapsularis was reflected medially.  The superior and inferior extracapsular retractors were placed.  Next Bovie electrocautery was used to incise the capsule making an L-shaped flap.  The retractors were placed intracapsularly.  Large osteophytes were seen throughout the femoral head neck region.  These were debrided as best possible at this time.  The intertrochanteric tubercle was identified and helped to identify the level of the femoral neck osteotomy compared with preoperative templating.  Next a sagittal saw was used to make the osteotomy in the femoral neck.  The extremity was placed on traction and gentle external rotation. The corkscrew  was used to remove the femoral head.  The femoral head was measured on the back table.  Next the traction was taken off the pubofemoral ligament was identified and released off the proximal femur down to the level of the lesser trochanter. The femur was then rotated externally to 90° and the remnant of the pubofemoral  ligament was released.  Next the mini Charnley retractor was placed intracapsularly to expose the acetabulum.  10 blade scalpel was used to remove the labral remnant.  The Bovie electrocautery was used to remove the ligament of teres and pulvinar.  Next C-arm imaging was used to make an AP pelvis as well as AP hip.  The acetabulum was sequentially reamed up to a 1 size smaller than the definitive implant.  I did utilize a 3D porous shell for supplemental fixation due to very sclerotic however poor bone quality.  The implant was impacted receiving excellent purchase.  2 screws were placed.  A definitive polyethylene liner was placed.  The acetabular component did have coverage anteriorly.  Overhanging inferior osteophytes were removed.  Final imaging of the acetabular component was assessed and determined to be appropriate inclination as well as version.  Next, attention was directed towards the femur.  The femoral lift was placed lateral to the vastus lateralis.  The femur was placed in 90° of external rotation.  A longitudinal incision was made in the posterior capsule and the ischiofemoral ligament.  This was incised to the level of the fat pad between the capsule and the gluteus medius and minimus.  A trochanteric retractor was placed here.  Next further release was performed reflecting the piriformis posteriorly over the trochanter.  The obturator externus was maintained as was the piriformis.  The conjoined tendon was reflected over the greater trochanter, maintaining its insertion.  The femur was delivered through the capsular rent and dropped to the floor. The femur was placed in external  rotation.  The box osteotome was used to open the femur followed by a rasp.  The femur was then sequentially broached up to the size of the definitive implant,  achieving excellent fixation.  A trial neck and head was placed in accordance with preoperative templating and intraoperative imaging.  The hip was reduced and had excellent stability at 90° with lateral traction with the bone hook  C-arm images were again obtained and interischial line technique using a metal drop marjorie was utilized to compare the operative extremity to the contralateral side in regards to leg length as well as offset.  I felt that the leg was still a little short however had excellent stability even with 90 degrees of lateral traction with the bone hook.  I retrialed the hip with a +3.5 mm head ball and leg lengths were much more symmetric.  I did not feel that I could lengthen her anymore due to soft tissue constraints.  The hip was dislocated again and definitive components were selected.  I did elect to utilize a cemented femoral prosthesis due to the patient's sclerotic but brittle bone and age at 84 years old.  A canal restrictor was placed.  The canal was then suction irrigated and then carefully dried with vaginal packing Palacos cement was then mixed on the back table under vacuum.  Retrograde fill technique was then used to pressurized the cement into the canal.  The stem was then impacted.  Cement was allowed to harden.  There was no evidence of intraoperative fracture.  The hip was again imaged to confirm stem position. This remained stable with 90 degrees of external rotation and lateral traction with the bone hook.  Next the wound was irrigated copiously sterile saline via pulsatile saline lavage with 0.35% betadine.  Periarticular injection was placed in the iliocapsularis as well as rectus femoris and tensor fascia grzegorz muscle bellies.  The capsule was repaired using Ethibond suture.  Next the fascia overlying the tensor  fascia grzegorz was closed using #1 strata fix PDS suture in running fashion.  Careful attention was paid towards avoiding the lateral femoral cutaneous nerve during the closure.  Subcutaneous layer was closed in 2-0 Vicryl suture interrupted inverted fashion followed by 3-0 Monocryl for the subcuticular layer.  Dermabond and a sterile occlusive dressing were applied.  All counts were correct at the end of the procedure.  The patient was awakened in satisfactory condition and transferred to the postoperative care unit.    Postoperative Plan:  Protocols for intravenous antibiotics and venous thrombosis were followed for this patient.  IV antibiotics were infused prior to surgery and will be discontinued within 24 hours of completion of the surgical procedure.   The patient will receive  Aspirin for DVT prophylaxis for 30 days post operatively  Patient is weightbearing as tolerated to the operative extremity     Erik Rocha MD

## 2024-09-25 NOTE — PLAN OF CARE
Goal Outcome Evaluation:         POD0, R Hip anterior replacement. Dressing c/d/I. VSS. R Foot Ulcer (R toe). Had spinal block during surgery, arrived on unit unable to perform active ROM of bilateral LE due to numbness, as of 1800 feeling has returned and able to perform active flexion of feet. Voids with purewick. Daughters at bedside.

## 2024-09-26 VITALS
HEART RATE: 73 BPM | DIASTOLIC BLOOD PRESSURE: 65 MMHG | RESPIRATION RATE: 16 BRPM | SYSTOLIC BLOOD PRESSURE: 111 MMHG | BODY MASS INDEX: 21.99 KG/M2 | OXYGEN SATURATION: 97 % | WEIGHT: 132 LBS | TEMPERATURE: 98 F | HEIGHT: 65 IN

## 2024-09-26 LAB
ANION GAP SERPL CALCULATED.3IONS-SCNC: 6.6 MMOL/L (ref 5–15)
BUN SERPL-MCNC: 15 MG/DL (ref 8–23)
BUN/CREAT SERPL: 14.4 (ref 7–25)
CALCIUM SPEC-SCNC: 8 MG/DL (ref 8.6–10.5)
CHLORIDE SERPL-SCNC: 106 MMOL/L (ref 98–107)
CO2 SERPL-SCNC: 22.4 MMOL/L (ref 22–29)
CREAT SERPL-MCNC: 1.04 MG/DL (ref 0.57–1)
EGFRCR SERPLBLD CKD-EPI 2021: 52.8 ML/MIN/1.73
GLUCOSE SERPL-MCNC: 127 MG/DL (ref 65–99)
HCT VFR BLD AUTO: 30.4 % (ref 34–46.6)
HGB BLD-MCNC: 10 G/DL (ref 12–15.9)
POTASSIUM SERPL-SCNC: 3.8 MMOL/L (ref 3.5–5.2)
SODIUM SERPL-SCNC: 135 MMOL/L (ref 136–145)

## 2024-09-26 PROCEDURE — A9270 NON-COVERED ITEM OR SERVICE: HCPCS | Performed by: ORTHOPAEDIC SURGERY

## 2024-09-26 PROCEDURE — 97530 THERAPEUTIC ACTIVITIES: CPT

## 2024-09-26 PROCEDURE — 97161 PT EVAL LOW COMPLEX 20 MIN: CPT

## 2024-09-26 PROCEDURE — 25010000002 KETOROLAC TROMETHAMINE PER 15 MG: Performed by: ORTHOPAEDIC SURGERY

## 2024-09-26 PROCEDURE — 25010000002 CEFAZOLIN PER 500 MG: Performed by: ORTHOPAEDIC SURGERY

## 2024-09-26 PROCEDURE — 63710000001 MELOXICAM 7.5 MG TABLET: Performed by: ORTHOPAEDIC SURGERY

## 2024-09-26 PROCEDURE — 25010000002 ONDANSETRON PER 1 MG: Performed by: ORTHOPAEDIC SURGERY

## 2024-09-26 PROCEDURE — 80048 BASIC METABOLIC PNL TOTAL CA: CPT | Performed by: ORTHOPAEDIC SURGERY

## 2024-09-26 PROCEDURE — G0378 HOSPITAL OBSERVATION PER HR: HCPCS

## 2024-09-26 PROCEDURE — 85018 HEMOGLOBIN: CPT | Performed by: ORTHOPAEDIC SURGERY

## 2024-09-26 PROCEDURE — 63710000001 HYDROCODONE-ACETAMINOPHEN 7.5-325 MG TABLET: Performed by: ORTHOPAEDIC SURGERY

## 2024-09-26 PROCEDURE — 85014 HEMATOCRIT: CPT | Performed by: ORTHOPAEDIC SURGERY

## 2024-09-26 PROCEDURE — 63710000001 ASPIRIN 81 MG TABLET DELAYED-RELEASE: Performed by: ORTHOPAEDIC SURGERY

## 2024-09-26 PROCEDURE — 63710000001 ATORVASTATIN 20 MG TABLET: Performed by: ORTHOPAEDIC SURGERY

## 2024-09-26 PROCEDURE — 63710000001 POLYETHYLENE GLYCOL 17 G PACK: Performed by: ORTHOPAEDIC SURGERY

## 2024-09-26 PROCEDURE — 97116 GAIT TRAINING THERAPY: CPT

## 2024-09-26 RX ORDER — ASPIRIN 81 MG/1
81 TABLET ORAL 2 TIMES DAILY
Qty: 60 TABLET | Refills: 0 | Status: SHIPPED | OUTPATIENT
Start: 2024-09-26 | End: 2024-10-26

## 2024-09-26 RX ORDER — HYDROCODONE BITARTRATE AND ACETAMINOPHEN 7.5; 325 MG/1; MG/1
1 TABLET ORAL EVERY 6 HOURS PRN
Qty: 30 TABLET | Refills: 0 | Status: SHIPPED | OUTPATIENT
Start: 2024-09-26 | End: 2024-10-05

## 2024-09-26 RX ORDER — MELOXICAM 7.5 MG/1
7.5 TABLET ORAL DAILY
Qty: 14 TABLET | Refills: 0 | Status: SHIPPED | OUTPATIENT
Start: 2024-09-26

## 2024-09-26 RX ADMIN — MELOXICAM 7.5 MG: 7.5 TABLET ORAL at 09:18

## 2024-09-26 RX ADMIN — SODIUM CHLORIDE 2 G: 900 INJECTION INTRAVENOUS at 04:18

## 2024-09-26 RX ADMIN — HYDROCODONE BITARTRATE AND ACETAMINOPHEN 1 TABLET: 7.5; 325 TABLET ORAL at 12:26

## 2024-09-26 RX ADMIN — ATORVASTATIN CALCIUM 20 MG: 20 TABLET, FILM COATED ORAL at 09:18

## 2024-09-26 RX ADMIN — ONDANSETRON 4 MG: 2 INJECTION, SOLUTION INTRAMUSCULAR; INTRAVENOUS at 02:48

## 2024-09-26 RX ADMIN — KETOROLAC TROMETHAMINE 15 MG: 15 INJECTION, SOLUTION INTRAMUSCULAR; INTRAVENOUS at 06:01

## 2024-09-26 RX ADMIN — HYDROCODONE BITARTRATE AND ACETAMINOPHEN 1 TABLET: 7.5; 325 TABLET ORAL at 00:23

## 2024-09-26 RX ADMIN — ASPIRIN 81 MG: 81 TABLET, COATED ORAL at 09:18

## 2024-09-26 RX ADMIN — POLYETHYLENE GLYCOL 3350 17 G: 17 POWDER, FOR SOLUTION ORAL at 09:19

## 2024-09-26 NOTE — NURSING NOTE
"Spoke with Dr Rocha regarding \"pressure sore\", nothing noted in OR notes from yesterday, no wounds noted on assessment. Apparently some redness was noted yesterday during surgery and he was worried about pressure moving forward and just wanted preventive measures and education provided to patient.  Patient encouraged to ambulate as this is the best prevention for pressure.  Patient also educated on how to float heels with pillows and waffle cushion provided for at home.  Patient and daughter also instructed on turning and shifting weight every 2 hours.  "

## 2024-09-26 NOTE — DISCHARGE PLACEMENT REQUEST
"Magali Hill (84 y.o. Female)       Date of Birth   1940    Social Security Number       Address   [address] [address]    Home Phone   [phone]    MRN   [id number]       [facility]    Marital Status                               Admission Date   9/25/24    Admission Type   Elective    Admitting Provider   Erik Rocha MD    Attending Provider   Erik Rocha MD    Department, Room/Bed   [address], P782/1       Discharge Date       Discharge Disposition   Home-Health Care [facility]    Discharge Destination                                 Attending Provider: Erik Rocha MD    Allergies: No Known Allergies    Isolation: None   Infection: None   Code Status: CPR    Ht: 165.1 cm (65\")   Wt: 59.9 kg (132 lb)    Admission Cmt: None   Principal Problem: H/O total hip arthroplasty, right [Z96.641]                   Active Insurance as of 9/25/2024       Primary Coverage       Payor Plan Insurance Group Employer/Plan Group    HUMANA MEDICARE REPLACEMENT HU[employer]NA MED ADV COMM NON PAR [id number]       Payor Plan Address Payor Plan Phone Number Payor Plan Fax Number Effective Dates       1/1/2023 - None Entered      Subscriber Name Subscriber Birth Date Member ID       MAGALI HILL 1940 [id number]                     Emergency Contacts        (Rel.) Home Phone Work Phone Mobile Phone    megha hogue (Daughter) -- -- [phone]    agusto abebe (Daughter) -- -- [phone]    ayala esquivel (Daughter) -- -- [phone]            "

## 2024-09-26 NOTE — DISCHARGE SUMMARY
Discharge Summary    Date of Admission: 9/25/2024  7:57 AM    Date of Discharge:  9/26/2024    Discharge Diagnosis:   H/O total hip arthroplasty, right [Z96.641]      PMHX:   Past Medical History:   Diagnosis Date    Arthritis     ROSENTHAL (dyspnea on exertion)     Hyperlipidemia     Hypertension     On home O2     RARELY USES - PRN ONLY    PAD (peripheral artery disease)     Right foot ulcer     FOLLOWED BY PODIATRY AND VASCULAR - ALMOST HEALED - DR MCKENZIE AWARE       Discharge Disposition  Home-Health Care [facility]    Procedures Performed  Procedure(s):  RIGHT TOTAL HIP ARTHROPLASTY ANTERIOR APPROACH       Indication for Admission  Patient is a 84 y.o. female admitted after undergoing the above surgical procedure. They were admitted for post-operative pain control, medical management and physical therapy.  They progressed with physical therapy.   They were deemed stable for discharge.      Consults:   Consults       No orders found for last 30 day(s).            Discharge Instructions:  Patient is weight bearing as tolerated on the operative leg.  Patient has no hip dislocation precautions.  Patient is to progress ambulation as tolerated.  Use walker as needed for stability and gait.  May progress to cane as tolerated.  The dressing is waterproof, and the patient may shower starting 3 days after the operation.  Keep dressing in place 7 days. May change dressing before saturated or starts to fall off.  Patient will follow-up in the office at 3 weeks. Home health physical therapy will follow patient once patient is discharged home.   Call the office at [phone] for any questions or concerns.      Discharge Medications     Discharge Medications        New Medications        Instructions Start Date   aspirin 81 MG EC tablet   81 mg, Oral, 2 Times Daily      HYDROcodone-acetaminophen 7.5-325 MG per tablet  Commonly known as: NORCO   1 tablet, Oral, Every 6 Hours PRN      meloxicam 7.5 MG tablet  Commonly known as:  MOBIC   7.5 mg, Oral, Daily             Continue These Medications        Instructions Start Date   acetaminophen 500 MG tablet  Commonly known as: TYLENOL   500 mg, Oral, Every 6 Hours PRN      albuterol sulfate  (90 Base) MCG/ACT inhaler  Commonly known as: PROVENTIL HFA;VENTOLIN HFA;PROAIR HFA   2 puffs, Inhalation, Every 4 Hours PRN      atorvastatin 20 MG tablet  Commonly known as: LIPITOR   20 mg, Oral, Daily      clopidogrel 75 MG tablet  Commonly known as: PLAVIX   75 mg, Oral, Daily, TO HOLD 7 DAYS PRIOR TO OR PER MD INSTRUCTIONS       losartan 25 MG tablet  Commonly known as: COZAAR   25 mg, Oral, Daily      multivitamin with minerals tablet tablet   1 tablet, Oral, Daily, TO HOLD 1 WEEK PRIOR TO OR                Discharge Diet: Regular diet    Activity at Discharge: Weight bearing as tolerated, ambulate with assist, PT, follow up in 2 weeks    Follow-up Appointments  No future appointments.          09/26/24,  08:39 EDT    Erik Rocha MD  Orthopaedic Surgeon    [facility]  [phone]

## 2024-09-26 NOTE — PLAN OF CARE
Goal Outcome Evaluation:            All goals met, safe for discharge.

## 2024-09-26 NOTE — PLAN OF CARE
Goal Outcome Evaluation:  Plan of Care Reviewed With: patient, daughter           Outcome Evaluation: Pt is a 85 y/o F POD 1 s/p R anterior DESIREE. Pt reports she lives with her daughter with 3 RUBÉN and uses both SC & 4WW at baseline. Pt presents to PT with expected post-op pain, weakness, and decreased endurance. Pt performed bed mobility, STS txf, ambulated 75' c RW, and completed 3 steps c HR requiring SBA/CGA. Pt demo's a slow step-to antalgic gait without a LOB. Pt does fatigue quickly. Pt completed DESIREE protocol and PT provided education regarding HEP, home safety, and post-op care. Pt is safe to D/C home today with daughter and f/u with HHPT.      Anticipated Discharge Disposition (PT): home with assist, home with home health

## 2024-09-26 NOTE — PROGRESS NOTES
Continued Stay Note  [facility]     Patient Name: Magali Pulliam  MRN: [id number]  Today's Date: 9/26/2024    Admit Date: 9/25/2024    Plan: [facility]/Family support   Discharge Plan       Row Name 09/26/24 1429       Plan    Plan [facility]/Family support    Patient/Family in Agreement with Plan yes    Plan Comments Spoke with pt, verified correct information on facesheet and explained the role of CCP. Pt [facility], referral given to [facility] which states they are able to accept. Plan will be home with HH and family support, no other needs identified at this time.    Final Discharge Disposition Code 06 - home with home health care    [facility]                   Discharge Codes    No documentation.                 Expected Discharge Date and Time       Expected Discharge Date Expected Discharge Time    Sep 26, 2024               Elina Ferrera RN

## 2024-09-26 NOTE — DISCHARGE INSTRUCTIONS
Dr. Erik Rocha  Anterior Total Hip Replacement Discharge Instructions:    Office Phone Number: [phone]    I. ACTIVITIES:  1. Exercises:  Complete exercise program as taught by the hospital physical therapist 2 times per day.  You may wean off the walker to a cane when directed by the physical therapist.  Exercise program will be advanced by the physical therapist  During the day be up ambulating every 2 hours (while awake) for short distances  Complete the ankle pump exercises at least 10 times per hour (while awake)  Elevate legs most of the day the first week post operatively and thereafter elevate legs when in bed and for at least 30 minutes during the day. Use cold packs 20-30 minutes approximately 5 times per day. This should be done before and after completing your exercises and at any time you are experiencing pain/ stiffness in your operative extremity.      2. Activities of Daily Living:  No tub baths, hot tubs, or swimming pools for 4 weeks  The clear dressing with thin white gauze strip dressing is waterproof.  You may shower without covering the dressing beginning 3 days after the operation.  After 7 days you may remove the dressing.  If the dressing becomes saturated prior to day 7, it may be changed.  After dressing removal, do not scrub or rub the incision. Allow skin glue to fall off over the next few weeks.  After the dressing is removed, simply let the water run over the incision and pat dry.    II. Restrictions  Follow any movement restrictions that was discussed with you by either Dr. Rocha or the physical therapist.     Dr. Rocha will discuss with you when you will be able to drive again at your first post-op appointment.  Weight bearing is as tolerated.  First week stay inside on even terrain. May go up and down stairs one stair at a time utilizing the hand rail.  Once you feel confident, you may venture outside.    III. Precautions:  Everyone that comes near you should wash  their hands  No elective dental, genital-urinary, or colon procedures or surgical procedures for 12 weeks after surgery unless absolutely necessary.   If dental work or surgical procedure is deemed absolutely necessary within 12 weeks of surgery, you will need to contact Dr. Rocha's office as you will need to take antibiotics 1 hour prior to any dental work (including teeth cleanings).  Dr. Rocha will prescribe prophylactic antibiotics for all dental procedures for one year  as a precautionary measure to minimize risk of infection.  If you are a diabetic or take immunosuppressive medication, you may have to take prophylactic antibiotics the remainder of your life before dental work.    Avoid sick people. If you must be around someone who is ill, they should wear a mask.  Avoid visits to the Emergency Room or Urgent Care unless you are having a life threatening event.   If you have leg swelling you may wear leg compression stocking.   Stockings are to be placed on in the morning and removed at night. Monitor the stockings to ensure that any swelling is not causing the stockings to become too tight. In this case, remove stockings immediately.    IV. INCISION CARE:  Dr. Rocha takes great care in closing your incision to give you the best opportunity for a healthy incision with minimal scarring. He places sutures below the skin surface that will eventually dissolve.  The incision is then covered with a skin glue which makes the incision water tight, and minimizes bleeding onto the dressing.  No staples are used.  Occasionally one of the buried stitches may come to the skin surface and may need to be removed.  Please resist the temptation of removing the stitch by yourself.   will be happy to remove it for you.  Bruising around the incision and thigh is normal and to be expected.  Please keep dressing in place at least until post-op day 7. You may remove and replace dressing before day 7 if the dressing  begins to fall off or becomes saturated. Wash your hands and under your finger nails prior to dressing changes.  After day 7 as long as incision is dry and intact, you may leave the dressing off and open to air.  You will need to find underwear that does not rub on the incision for a few weeks after the surgery.  You may find it more comfortable to place a dressing on to keep your underwear from rubbing the incision.       If dressing must be changed, utilize dry gauze and paper tape. Avoid touching the side of the gauze that goes against the incision with your hands.  No creams or ointments to the incision until permission given by Dr. Rocha.  Do not touch or pick at the incision, or try to remove any sutures or skin glue.  Check dressing every day and notify surgeon immediately if any of the following signs or symptoms are noted:  Increase in redness  Increase in swelling of the entire extremity that does not go away with elevation.  Notify office that you may have a blood clot.    Drainage oozing from the incision  Pulling apart of the edges of the incision  Increase in overall body temperature (greater than 100.5 degrees)    V. Medications:   1. Anticoagulants: You will be discharged on an anticoagulant. This is a prophylactic medication that helps prevent blood clots during your post-operative period. The type and length of dosage varies based on your individual needs, procedure performed, and Dr. Rocha's preference.  While taking the anticoagulant, you should avoid taking any additional aspirin than what is prescribed.   Notify surgeon immediately if any denny bleeding is noted in the urine, stool, emesis, or from the nose or the incision. Blood in the stool will often appear as black rather than red. Blood in urine may appear as pink. Blood in emesis may appear as brown/black like coffee grounds.  You will need to apply pressure for longer periods of time to any cuts or abrasions to stop bleeding  Avoid  alcohol while taking anticoagulants.    2. Stool Softeners: You will be at greater risk of constipation after surgery due to being less mobile and the pain medications.   Take stool softeners as instructed by your surgeon while on pain medications. Over the counter Colace 100 mg 1-2 capsules twice daily.   If stools become too loose or too frequent, please decreases the dosage or stop the stool softener.  If constipation occurs despite use of stool softeners, you are to continue the stool softeners and add a laxative (Milk of Magnesia 1 ounce daily as needed).  If no bowel movement occurs past 3 days, then purchase Magnesium citrate and drink 1/2 bottle every 8 hours (on ice tastes better) until success. If no bowel movement by post-operative day 5 please call Dr. Jacob office for further instructions.   You may need to decrease or stop your pain medications if bowel movements to not occur.     Drink plenty of fluids, and eat fruits and vegetables during your recovery time.    3. Pain Medications utilized after surgery are narcotics and the law requires that the following information be given to all patients that are prescribed narcotics:  CLASSIFICATION: Pain medications are called Opioids and are narcotics  LEGALITIES: It is illegal to share narcotics with others and to drive within 24 hours of taking narcotics  POTENTIAL SIDE EFFECTS: Potential side effects of opioids include: nausea, vomiting, itching, dizziness, drowsiness, dry mouth, constipation, and difficulty urinating.  POTENTIAL ADVERSE EFFECTS:   Opioid tolerance can develop with use of pain medications and this simply means that it requires more and more of the medication to control pain; however, this is seen more in patients that use opioids for longer periods of time.  Opioid dependence can develop with use of Opioids and this simply means that to stop the medication can cause withdrawal symptoms; however, this is seen with patients that use  "Opioids for longer periods of time.  Opioid addiction can develop with use of Opioids and the incidence of this is very unlikely in patients who take the medications as ordered and stop the medications as instructed.  Opioid overdose can be dangerous, but is unlikely when the medication is taken as ordered and stopped when ordered. It is important not to mix opioids with alcohol or with and type of sedative such as Benadryl as this can lead to over sedation and respiratory difficulty.  DOSAGE:   Pain medications will need to be taken consistently for the first few days to decrease pain and promote adequate pain relief and participation in physical therapy.  After the initial surgical pain begins to resolve, you may begin to decrease the pain medication. By the end of 6 weeks, you should be off of pain medications except for before physical therapy or to help with pain when attempting to fall asleep.  Pain medications will be tapered to lesser dosages as you are further from your surgical day.  No pain medications will be provided after 3 months from surgery.     Refills will not be given by the office during evening hours, or weekends.  To seek refills on pain medications during the post-operative period, you must call the office 48 hours in advance to request the refill. The office will then notify you when to  the prescription. DO NOT wait until you are out of the medication to request a refill.  They can not be \"called in\" to the pharmacy.      How to Wean Off Pain Medication:   As you begin to feel better, gradually wean off the narcotic pain medication and begin to use it only for breakthrough pain.  Gradually reduce the total number of pills you take each day.  This can be done by taking fewer pills at a time or by increasing the amount of time between each pill.    For example, if you were taking 2 pills every 6 hours you would be taking a total of 8 pills per day.  Reduce this to 6 pills per day, then " 4-5 and so on.  This can be done by taking 1 pill at a time instead of 2, or by taking the pills every 8 hours instead of every 6.    As you begin to wean from the narcotic pain medication, begin substituting with over the counter tylenol when you are not taking the narcotic.  Limit total tylenol dosage to less than 4 grams per day.    V. FOLLOW-UP VISITS:  You will need to follow up in the office with Dr. Rocha at 3 weeks.  Please call [phone] if you need to confirm or reschedule your appointment time.   If you have any concerns or suspected complications prior to your follow up visit, please call your surgeons office. Do not wait until your appointment time if you suspect complications. These will need to be addressed in the office promptly.

## 2024-09-26 NOTE — PROGRESS NOTES
Abdullahi Velez PA-C       Orthopedic Progress Note    Subjective :   Feeling well this morning.  Resting bed comfortably.  Postop day 1 right total hip arthroplasty.    Objective :    Vital signs in last 24 hours:  Temp:  [97.5 °F (36.4 °C)-98.3 °F (36.8 °C)] 97.7 °F (36.5 °C)  Heart Rate:  [50-83] 59  Resp:  [14-20] 17  BP: ()/(47-91) 105/62  Vitals:    09/25/24 1727 09/25/24 2154 09/26/24 0221 09/26/24 0523   BP: 90/50 118/54 101/62 105/62   BP Location: Right arm Right arm Right arm Right arm   Patient Position: Lying Lying Lying Lying   Pulse: 83 61 63 59   Resp: 16 16 16 17   Temp: 97.8 °F (36.6 °C) 97.5 °F (36.4 °C) 97.7 °F (36.5 °C) 97.7 °F (36.5 °C)   TempSrc: Oral Oral Oral Oral   SpO2: 93% 95% 95% 100%   Weight:       Height:           PHYSICAL EXAM:  Patient is calm, in no acute distress, awake and oriented x 3.  Dressing clean, dry and intact.  No signs of infection.  Swelling is appropriate.  Ecchymosis is appropriate in amount.  Homans test is negative.  Patient is neurovascularly intact distally.      LABS:  Results from last 7 days   Lab Units 09/26/24  0444   HEMOGLOBIN g/dL 10.0*   HEMATOCRIT % 30.4*     Results from last 7 days   Lab Units 09/26/24  0444   SODIUM mmol/L 135*   POTASSIUM mmol/L 3.8   CHLORIDE mmol/L 106   CO2 mmol/L 22.4   BUN mg/dL 15   CREATININE mg/dL 1.04*   GLUCOSE mg/dL 127*   CALCIUM mg/dL 8.0*           ASSESSMENT:  Status post right total hip arthroplasty    Plan:  Continue Physical Therapy, ambulation with assist.  Continue SCDs, Continue aspirin 81 mg for DVT prophylaxis.  Dispo planning for home with home health when medically stable.  Patient expressed concerns about reported developing bedsores noted by surgical staff.  Will consult wound management this a.m.  Planning on discharge today.    Abdullahi Velez PA-C    Date: 9/26/2024  Time: 06:40 EDT

## 2024-09-26 NOTE — THERAPY EVALUATION
Patient Name: Magali Pulliam  : 1940    MRN: [id number]                              Today's Date: 2024       Admit Date: 2024    Visit Dx:     ICD-10-CM ICD-9-CM   1. H/O total hip arthroplasty, right  Z96.641 V43.64     Patient Active Problem List   Diagnosis    H/O total hip arthroplasty, right     Past Medical History:   Diagnosis Date    Arthritis     ROSENTHAL (dyspnea on exertion)     Hyperlipidemia     Hypertension     On home O2     RARELY USES - PRN ONLY    PAD (peripheral artery disease)     Right foot ulcer     FOLLOWED BY PODIATRY AND VASCULAR - ALMOST HEALED - DR MCKENZIE AWARE     Past Surgical History:   Procedure Laterality Date    COLONOSCOPY      TUBAL ABDOMINAL LIGATION        General Information       Row Name 24          Physical Therapy Time and Intention    Document Type discharge evaluation/summary  -CS     Mode of Treatment individual therapy;physical therapy  -CS       Row Name 24          General Information    Patient Profile Reviewed yes  -CS     Prior Level of Function independent:;gait;transfer;bed mobility  SC & 4WW for mobility at baseline  -CS     Existing Precautions/Restrictions fall;right;hip, anterior  -CS     Barriers to Rehab none identified  -CS       Row Name 24          Living Environment    People in Home child(saritha), adult  -CS       Row Name 24 09          Home Main Entrance    Number of Stairs, Main Entrance three  -CS     Stair Railings, Main Entrance railings safe and in good condition  -CS       Row Name 24          Stairs Within Home, Primary    Number of Stairs, Within Home, Primary none  -CS       Row Name 24          Cognition    Orientation Status (Cognition) oriented x 4  -CS       Row Name 24          Safety Issues, Functional Mobility    Impairments Affecting Function (Mobility) endurance/activity tolerance;pain;strength  -CS               User Key  (r) = Recorded By, (t) =  Taken By, (c) = Cosigned By      Initials Name Provider Type    CS Daphnie Gonzales, PT Physical Therapist                   Mobility       Row Name 09/26/24 0917          Bed Mobility    Bed Mobility supine-sit  -CS     Supine-Sit [place] (Bed Mobility) standby assist  -CS     Assistive Device (Bed Mobility) head of bed elevated  -CS     Comment, (Bed Mobility) [facility] at end of session  -CS       Row Name 09/26/24 0917          Sit-Stand Transfer    Sit-Stand Mol[place] (Transfers) standby assist;verbal cues  -CS     Assistive Device (Sit-Stand Transfers) walker, front-wheeled  -CS     Comment, (Sit-Stand Transfer) VC for UE placement  -CS       Row Name 09/26/24 0917          Gait/Stairs (Locomotion)    Molena Level (Gait) contact guard  -CS     Assistive Device (Gait) walker, front-wheeled  -CS     Distance in Feet (Gait) 75  -CS     Deviations/Abnormal Patterns (Gait) antalgic;sangeeta decreased;stride length decreased  -CS     Bilateral Gait Deviations forward flexed posture  -CS     Right Sided Gait Deviations weight shift ability decreased  -CS     [place] Level (Stairs) contact guard;verbal cues  -CS     Handrail Location (Stairs) left side (ascending);right side (descending)  -CS     Number of Steps (Stairs) 3  -CS     Ascending Technique (Stairs) step-to-step  -CS     Descending Technique (Stairs) step-to-step  -CS     Comment, (Gait/Stairs) slow step-to gait; no LOB  -CS       Row Name 09/26/24 0917          Mobility    Extremity Weight-bearing Status right lower extremity  -CS     Right Lower Extremity (Weight-bearing Status) weight-bearing as tolerated (WBAT)  -CS               User Key  (r) = Recorded By, (t) = Taken By, (c) = Cosigned By      Initials Name Provider Type    CS Daphnie Gonzales, PT Physical Therapist                   Obj/Interventions       Row Name 09/26/24 0918          Range of Motion Comprehensive    General Range of Motion other (see comments)  -CS     Comment, General  Range of Motion R LE limited secondary to post-op; L LE WFL  -CS       Row Name 09/26/24 0918          Strength Comprehensive (MMT)    General Manual Muscle Testing (MMT) Assessment other (see comments)  -CS     Comment, General Manual Muscle Testing (MMT) Assessment R LE limited secondary to post-op; L LE WFL  -CS       Row Name 09/26/24 0918          Motor Skills    Therapeutic Exercise other (see comments)  10 reps R DESIREE protocol  -       Row Name 09/26/24 0918          Balance    Comment, Balance WFL  -               User Key  (r) = Recorded By, (t) = Taken By, (c) = Cosigned By      Initials Name Provider Type    CS Daphnie Gonzales, PT Physical Therapist                   Goals/Plan    No documentation.                  Clinical Impression       Row Name 09/26/24 0918          Pain    Pretreatment Pain Rating 0/10 - no pain  -       Row Name 09/26/24 0918          Plan of Care Review    Plan of Care Reviewed With patient;daughter  -     Outcome Evaluation Pt is a 85 y/o F POD 1 s/p R anterior DESIREE. Pt reports she lives with her daughter with 3 RUBÉN and uses both SC & 4WW at baseline. Pt presents to PT with expected post-op pain, weakness, and decreased endurance. Pt performed bed mobility, STS txf, ambulated 75' c RW, and completed 3 steps c HR requiring SBA/CGA. Pt demo's a slow step-to antalgic gait without a LOB. Pt does fatigue quickly. Pt completed DESIREE protocol and PT provided education regarding HEP, home safety, and post-op care. Pt is safe to D/C home today with daughter and f/u with HHPT.  -CS       Row Name 09/26/24 0918          Therapy Assessment/Plan (PT)    Criteria for Skilled Interventions Met (PT) yes;skilled treatment is necessary  -CS     Therapy Frequency (PT) evaluation only  -CS       Row Name 09/26/24 0918          Vital Signs    O2 Delivery Pre Treatment room air  -CS     O2 Delivery Intra Treatment room air  -CS     Post SpO2 (%) 96  -CS     O2 Delivery Post Treatment room air   -CS       Row Name 09/26/24 0918          Positioning and Restraints    Pre-Treatment Position in bed  -CS     Post Treatment Position chair  -CS     In Chair reclined;call light within reach;encouraged to call for assist;exit alarm on;with family/caregiver;heels elevated;notified nsg  -CS               User Key  (r) = Recorded By, (t) = Taken By, (c) = Cosigned By      Initials Name Provider Type    CS Daphnie Gonzales, PT Physical Therapist                   Outcome Measures       Row Name 09/26/24 0920          How much help from another person do you currently need...    Turning from your back to your side while in flat bed without using bedrails? 4  -CS     Moving from lying on back to sitting on the side of a flat bed without bedrails? 3  -CS     Moving to and from a bed to a chair (including a wheelchair)? 4  -CS     Standing up from a chair using your arms (e.g., wheelchair, bedside chair)? 4  -CS     Climbing 3-5 steps with a railing? 3  -CS     To walk in hospital room? 3  -CS     AM-PAC 6 Clicks Score (PT) 21  -CS     Highest Level of Mobility Goal 6 --> Walk 10 steps or more  -CS       Row Name 09/26/24 0920          Functional Assessment    Outcome Measure Options AM-PAC 6 Clicks Basic Mobility (PT)  -CS               User Key  (r) = Recorded By, (t) = Taken By, (c) = Cosigned By      Initials Name Provider Type    Daphnie Ramey, PT Physical Therapist                                 Physical Therapy Education       Title: PT OT SLP Therapies (Done)       Topic: Physical Therapy (Done)       Point: Mobility training (Done)       Learning Progress Summary             Patient Acceptance, E,TB, VU,DU by CS at 9/26/2024 0921   Family Acceptance, E,TB, VU,DU by CS at 9/26/2024 0921                         Point: Home exercise program (Done)       Learning Progress Summary             Patient Acceptance, E,TB, VU,DU by CS at 9/26/2024 0921   Family Acceptance, E,TB, VU,DU by CS at 9/26/2024 0921                          Point: Body mechanics (Done)       Learning Progress Summary             Patient Acceptance, E,TB, VU,DU by  at 9/26/2024 0921   Family Acceptance, E,TB, VU,DU by  at 9/26/2024 0921                         Point: Precautions (Done)       Learning Progress Summary             Patient Acceptance, E,TB, VU,DU by  at 9/26/2024 0921   Family Acceptance, E,TB, VU,DU by  at 9/26/2024 0921                                         User Key       Initials Effective Dates Name Provider Type Discipline     09/22/22 -  Daphnie Gonzales, PT Physical Therapist PT                  PT Recommendation and Plan     Plan of Care Reviewed With: patient, daughter  Outcome Evaluation: Pt is a 83 y/o F POD 1 s/p R anterior DESIREE. Pt reports she lives with her daughter with 3 RUBÉN and uses both SC & 4WW at baseline. Pt presents to PT with expected post-op pain, weakness, and decreased endurance. Pt performed bed mobility, STS txf, ambulated 75' c RW, and completed 3 steps c HR requiring SBA/CGA. Pt demo's a slow step-to antalgic gait without a LOB. Pt does fatigue quickly. Pt completed DESIREE protocol and PT provided education regarding HEP, home safety, and post-op care. Pt is safe to D/C home today with daughter and f/u with HHPT.     Time Calculation:         PT Charges       Row Name 09/26/24 0921             Time Calculation    Start Time 0821  -CS      Stop Time 0851  -CS      Time Calculation (min) 30 min  -CS      PT Received On 09/26/24  -         Time Calculation- PT    Total Timed Code Minutes- PT 25 minute(s)  -CS         Timed Charges    22941 - Gait Training Minutes  10  -CS      03920 - PT Therapeutic Activity Minutes 15  -CS         Total Minutes    Timed Charges Total Minutes 25  -CS       Total Minutes 25  -CS                User Key  (r) = Recorded By, (t) = Taken By, (c) = Cosigned By      Initials Name Provider Type    Daphnie Ramey, PT Physical Therapist                  Therapy Charges for Today        Code Description Service Date Service Provider Modifiers Qty    77981123913 HC GAIT TRAINING EA 15 MIN 9/26/2024 Daphnie Gonzales, PT GP 1    26816402036 HC PT THERAPEUTIC ACT EA 15 MIN 9/26/2024 Daphnie Gonzales, PT GP 1    79947254276 HC PT EVAL LOW COMPLEXITY 3 9/26/2024 Daphnie Gonzales, PT GP 1            PT G-Codes  Outcome Measure Options: AM-PAC 6 Clicks Basic Mobility (PT)  AM-PAC 6 Clicks Score (PT): 21  PT Discharge Summary  Anticipated Discharge Disposition (PT): home with assist, home with home health    Daphnie Gonzales PT  9/26/2024

## 2024-09-26 NOTE — PLAN OF CARE
Goal Outcome Evaluation:  Plan of Care Reviewed With: patient        Progress: improving  Outcome Evaluation: Pt remains stable. Spinal has worn off and pt able to ambulate. Requires cueing but able to ambulate with 1 assist and walker use. Will get PT eval today. Voiding per [facility]. Norco given for pain. Dressing to R hip is cdi. Educated on htn management. Required 2L O2 while asleep. Plans to dc home when cleared.

## 2024-10-02 ENCOUNTER — TELEPHONE (OUTPATIENT)
Dept: ORTHOPEDIC SURGERY | Facility: HOSPITAL | Age: 84
End: 2024-10-02
Payer: MEDICARE

## 2024-10-02 NOTE — TELEPHONE ENCOUNTER
Called and spoke with Ms. Pulliam at this time to see how she is doing as she is 1 week SP RTH. She said she is doing fine. She is working with PT and they were there at the time of this call. Ms. Pulliam said everything is going well and she doesn't have any questions for me at this time. She was given my contact information should she need anything.

## (undated) DEVICE — DRSNG SLVR/ANTIBAC PRIMASEAL POST/OP ADHS 3.5X10IN

## (undated) DEVICE — ANTIBACTERIAL UNDYED BRAIDED (POLYGLACTIN 910), SYNTHETIC ABSORBABLE SUTURE: Brand: COATED VICRYL

## (undated) DEVICE — WEREWOLF FASTSEAL 6.0 HEMOSTASIS WAND: Brand: FASTSEAL 6.0 HEMOSTASIS WAND

## (undated) DEVICE — THE STERILE LIGHT HANDLE COVER IS USED WITH STERIS SURGICAL LIGHTING AND VISUALIZATION SYSTEMS.

## (undated) DEVICE — PK ANT HIP 40

## (undated) DEVICE — PENCL SMOKE/EVAC MEGADYNE TELESCP 10FT

## (undated) DEVICE — SOL IRR NACL 0.9PCT 3000ML

## (undated) DEVICE — GLV SURG BIOGEL LTX PF 8

## (undated) DEVICE — GLV SURG PREMIERPRO ORTHO LTX PF SZ8 BRN

## (undated) DEVICE — SUT ETHIB 2 CV V37 MS/4 30IN MX69G

## (undated) DEVICE — PATIENT RETURN ELECTRODE, SINGLE-USE, CONTACT QUALITY MONITORING, ADULT, WITH 9FT CORD, FOR PATIENTS WEIGING OVER 33LBS. (15KG): Brand: MEGADYNE

## (undated) DEVICE — MAT FLR ABSORBENT LG 4FT 10 2.5FT

## (undated) DEVICE — PCH INST SURG INVISISHIELD 2PCKT

## (undated) DEVICE — TOWEL,OR,DSP,ST,BLUE,STD,4/PK,20PK/CS: Brand: MEDLINE

## (undated) DEVICE — DECANTER BAG 9": Brand: MEDLINE INDUSTRIES, INC.

## (undated) DEVICE — DUAL CUT SAGITTAL BLADE

## (undated) DEVICE — 3M™ IOBAN™ 2 ANTIMICROBIAL INCISE DRAPE 6640EZ: Brand: IOBAN™ 2

## (undated) DEVICE — RECIPROCATING BLADE HEAVY DUTY LONG, OFFSET  (77.6 X 0.77 X 11.2MM)

## (undated) DEVICE — TRAP FLD MINIVAC MEGADYNE 100ML

## (undated) DEVICE — APPL CHLORAPREP HI/LITE 26ML ORNG

## (undated) DEVICE — PREP IM ENCHANCED TOTAL HIP BONE                                    PREPARATION KIT: Brand: PREP-IM

## (undated) DEVICE — NEEDLE, QUINCKE, 20GX3.5": Brand: MEDLINE

## (undated) DEVICE — MEDI-VAC YANKAUER SUCTION HANDLE W/BULBOUS TIP: Brand: CARDINAL HEALTH

## (undated) DEVICE — SOL ISO/ALC 70PCT 4OZ

## (undated) DEVICE — CEMENT MIXING SYSTEM WITH FEMORAL BREAKWAY NOZZLE: Brand: REVOLUTION

## (undated) DEVICE — Device

## (undated) DEVICE — PREP SOL POVIDONE/IODINE BT 4OZ

## (undated) DEVICE — ADHS SKIN SURG TISS VISC PREMIERPRO EXOFIN HI/VISC FAST/DRY

## (undated) DEVICE — GLV SURG SIGNATURE ESSENTIAL PF LTX SZ8

## (undated) DEVICE — SHEET, DRAPE, SPLIT, STERILE: Brand: MEDLINE